# Patient Record
Sex: MALE | URBAN - METROPOLITAN AREA
[De-identification: names, ages, dates, MRNs, and addresses within clinical notes are randomized per-mention and may not be internally consistent; named-entity substitution may affect disease eponyms.]

---

## 2022-07-05 ENCOUNTER — LAB REQUISITION (OUTPATIENT)
Dept: LAB | Facility: HOSPITAL | Age: 72
End: 2022-07-05
Payer: MEDICARE

## 2022-07-05 DIAGNOSIS — L72.8 OTHER FOLLICULAR CYSTS OF THE SKIN AND SUBCUTANEOUS TISSUE: ICD-10-CM

## 2022-07-05 PROCEDURE — 87070 CULTURE OTHR SPECIMN AEROBIC: CPT | Performed by: PEDIATRICS

## 2022-07-08 LAB — BACTERIA SPEC CULT: NORMAL

## 2024-07-23 ENCOUNTER — ANESTHESIA EVENT (OUTPATIENT)
Dept: CARDIOLOGY | Facility: HOSPITAL | Age: 74
End: 2024-07-23
Payer: MEDICARE

## 2024-07-23 ENCOUNTER — ANESTHESIA (OUTPATIENT)
Dept: CARDIOLOGY | Facility: HOSPITAL | Age: 74
End: 2024-07-23
Payer: MEDICARE

## 2024-07-23 ENCOUNTER — HOSPITAL ENCOUNTER (OUTPATIENT)
Dept: CARDIOLOGY | Facility: HOSPITAL | Age: 74
Discharge: HOME | End: 2024-07-23
Attending: INTERNAL MEDICINE
Payer: MEDICARE

## 2024-07-23 VITALS
BODY MASS INDEX: 32.21 KG/M2 | SYSTOLIC BLOOD PRESSURE: 106 MMHG | HEART RATE: 59 BPM | OXYGEN SATURATION: 97 % | HEIGHT: 67 IN | WEIGHT: 205.25 LBS | RESPIRATION RATE: 18 BRPM | DIASTOLIC BLOOD PRESSURE: 63 MMHG | TEMPERATURE: 97 F

## 2024-07-23 DIAGNOSIS — I48.91 A-FIB: ICD-10-CM

## 2024-07-23 DIAGNOSIS — I48.91 ATRIAL FIBRILLATION: Primary | ICD-10-CM

## 2024-07-23 DIAGNOSIS — I48.91 ATRIAL FIBRILLATION: ICD-10-CM

## 2024-07-23 LAB
OHS QRS DURATION: 102 MS
OHS QRS DURATION: 102 MS
OHS QTC CALCULATION: 453 MS
OHS QTC CALCULATION: 455 MS
POCT GLUCOSE: 95 MG/DL (ref 70–110)

## 2024-07-23 PROCEDURE — 37000008 HC ANESTHESIA 1ST 15 MINUTES

## 2024-07-23 PROCEDURE — 37000009 HC ANESTHESIA EA ADD 15 MINS

## 2024-07-23 PROCEDURE — 63600175 PHARM REV CODE 636 W HCPCS: Performed by: NURSE ANESTHETIST, CERTIFIED REGISTERED

## 2024-07-23 PROCEDURE — 93005 ELECTROCARDIOGRAM TRACING: CPT

## 2024-07-23 PROCEDURE — 93010 ELECTROCARDIOGRAM REPORT: CPT | Mod: ,,, | Performed by: INTERNAL MEDICINE

## 2024-07-23 PROCEDURE — 25000003 PHARM REV CODE 250: Performed by: NURSE ANESTHETIST, CERTIFIED REGISTERED

## 2024-07-23 PROCEDURE — 92960 CARDIOVERSION ELECTRIC EXT: CPT

## 2024-07-23 RX ORDER — FLECAINIDE ACETATE 100 MG/1
50 TABLET ORAL 2 TIMES DAILY
COMMUNITY
Start: 2024-07-03

## 2024-07-23 RX ORDER — DULAGLUTIDE 0.75 MG/.5ML
0.75 INJECTION, SOLUTION SUBCUTANEOUS
COMMUNITY
Start: 2024-02-29

## 2024-07-23 RX ORDER — LEVOTHYROXINE, LIOTHYRONINE 57; 13.5 UG/1; UG/1
90 TABLET ORAL
COMMUNITY
Start: 2024-02-29

## 2024-07-23 RX ORDER — METOPROLOL SUCCINATE 50 MG/1
50 TABLET, EXTENDED RELEASE ORAL 2 TIMES DAILY
COMMUNITY

## 2024-07-23 RX ORDER — PHENYLEPHRINE HYDROCHLORIDE 10 MG/ML
INJECTION INTRAVENOUS
Status: DISCONTINUED | OUTPATIENT
Start: 2024-07-23 | End: 2024-07-23

## 2024-07-23 RX ORDER — OMEPRAZOLE 20 MG/1
20 CAPSULE, DELAYED RELEASE ORAL DAILY
COMMUNITY

## 2024-07-23 RX ORDER — APIXABAN 5 MG/1
1 TABLET, FILM COATED ORAL 2 TIMES DAILY
COMMUNITY
Start: 2024-02-29

## 2024-07-23 RX ORDER — TAMSULOSIN HYDROCHLORIDE 0.4 MG/1
1 CAPSULE ORAL DAILY
COMMUNITY

## 2024-07-23 RX ORDER — PROPOFOL 10 MG/ML
VIAL (ML) INTRAVENOUS
Status: DISCONTINUED | OUTPATIENT
Start: 2024-07-23 | End: 2024-07-23

## 2024-07-23 RX ORDER — HYDROCHLOROTHIAZIDE 25 MG/1
1 TABLET ORAL DAILY
COMMUNITY
Start: 2024-02-29

## 2024-07-23 RX ORDER — DULOXETIN HYDROCHLORIDE 30 MG/1
1 CAPSULE, DELAYED RELEASE ORAL DAILY
COMMUNITY

## 2024-07-23 RX ORDER — CETIRIZINE HYDROCHLORIDE 10 MG/1
1 TABLET, CHEWABLE ORAL EVERY MORNING
COMMUNITY

## 2024-07-23 RX ADMIN — SODIUM CHLORIDE, SODIUM GLUCONATE, SODIUM ACETATE, POTASSIUM CHLORIDE AND MAGNESIUM CHLORIDE: 526; 502; 368; 37; 30 INJECTION, SOLUTION INTRAVENOUS at 07:07

## 2024-07-23 RX ADMIN — PHENYLEPHRINE HYDROCHLORIDE 50 MCG: 10 INJECTION INTRAVENOUS at 07:07

## 2024-07-23 RX ADMIN — PROPOFOL 80 MG: 10 INJECTION, EMULSION INTRAVENOUS at 07:07

## 2024-07-23 NOTE — TRANSFER OF CARE
"Anesthesia Transfer of Care Note    Patient: Hilario Esteves    Procedure(s) Performed: * No procedures listed *    Patient location: PACU    Anesthesia Type: general    Transport from OR: Transported from OR on room air with adequate spontaneous ventilation    Post pain: adequate analgesia    Post assessment: no apparent anesthetic complications    Post vital signs: stable    Level of consciousness: sedated and responds to stimulation    Nausea/Vomiting: no nausea/vomiting    Complications: none    Transfer of care protocol was followed      Last vitals: Visit Vitals  /77   Pulse 81   Temp 36.3 °C (97.4 °F) (Oral)   Ht 5' 7" (1.702 m)   Wt 93.1 kg (205 lb 4 oz)   SpO2 97%   BMI 32.15 kg/m²     "

## 2024-07-23 NOTE — DISCHARGE SUMMARY
[facility] - Cardiology Diagnostics  Discharge Note  Short Stay    Cardioversion      OUTCOME: Patient tolerated treatment/procedure well without complication and is now ready for discharge.    DISPOSITION: Home or Self Care    FINAL DIAGNOSIS:  A-fib    FOLLOWUP: In clinic    DISCHARGE INSTRUCTIONS:  No discharge procedures on file.      Clinical Reference Documents Added to Patient Instructions         Document    CARDIOVERSION DISCHARGE INSTRUCTIONS (ENGLISH)    GENERAL ANESTHESIA DISCHARGE INSTRUCTIONS (ENGLISH)            TIME SPENT ON DISCHARGE: 15 minutes

## 2024-07-23 NOTE — ANESTHESIA PREPROCEDURE EVALUATION
07/23/2024  Hilario Esteves is a 74 y.o., male.  History of hyperlipidemia prostate cancer and atrial fibrillation, new onset.  Presenting for DC cardioversion.    On echo EF 50-55% and left heart catheterization shows widely patent coronaries    Pre-op Assessment    I have reviewed the Patient Summary Reports.     I have reviewed the Nursing Notes. I have reviewed the NPO Status.   I have reviewed the Medications.     Review of Systems  Anesthesia Hx:  No problems with previous Anesthesia                    Physical Exam  General: Well nourished, Cooperative, Alert and Oriented    Airway:  Mallampati: II   Mouth Opening: Normal  TM Distance: Normal  Tongue: Normal  Neck ROM: Normal ROM    Dental:  Intact        Anesthesia Plan  Type of Anesthesia, risks & benefits discussed:    Anesthesia Type: Gen Natural Airway  Intra-op Monitoring Plan: Standard ASA Monitors  Post Op Pain Control Plan: multimodal analgesia  Induction:  IV  Informed Consent: Informed consent signed with the Patient and all parties understand the risks and agree with anesthesia plan.  All questions answered. Patient consented to blood products? Yes  ASA Score: 3  Day of Surgery Review of History & Physical: H&P Update referred to the surgeon/provider.    Ready For Surgery From Anesthesia Perspective.     .

## 2024-07-26 NOTE — ANESTHESIA POSTPROCEDURE EVALUATION
Anesthesia Post Evaluation    Patient: Hilario Esteves    Procedure(s) Performed: * No procedures listed *    Final Anesthesia Type: general      Patient location during evaluation: PACU  Patient participation: Yes- Able to Participate  Level of consciousness: awake and alert  Post-procedure vital signs: reviewed and stable  Pain management: adequate  Airway patency: patent      Anesthetic complications: no      Cardiovascular status: hemodynamically stable  Respiratory status: unassisted  Hydration status: euvolemic  Follow-up not needed.              Vitals Value Taken Time   /63 07/23/24 0916   Temp n 07/26/24 1044   Pulse 62 07/23/24 0919   Resp 15 07/23/24 0918   SpO2 92 % 07/23/24 0919   Vitals shown include unfiled device data.      No case tracking events are documented in the log.      Pain/Rogers Score: No data recorded

## 2024-08-12 ENCOUNTER — ANESTHESIA EVENT (OUTPATIENT)
Dept: CARDIOLOGY | Facility: HOSPITAL | Age: 74
End: 2024-08-12
Payer: MEDICARE

## 2024-08-13 RX ORDER — TRIAMCINOLONE ACETONIDE 55 UG/1
2 SPRAY, METERED NASAL DAILY PRN
COMMUNITY

## 2024-08-13 RX ORDER — ICOSAPENT ETHYL 1 G/1
1 CAPSULE ORAL NIGHTLY
COMMUNITY

## 2024-08-13 RX ORDER — CHLORPHENIRAMINE MALEATE 4 MG
1 TABLET ORAL EVERY 6 HOURS PRN
COMMUNITY

## 2024-08-13 RX ORDER — AMOXICILLIN 500 MG
1 CAPSULE ORAL DAILY
COMMUNITY
End: 2024-08-13 | Stop reason: CLARIF

## 2024-08-19 ENCOUNTER — HOSPITAL ENCOUNTER (OUTPATIENT)
Facility: HOSPITAL | Age: 74
Discharge: HOME | End: 2024-08-19
Attending: INTERNAL MEDICINE | Admitting: INTERNAL MEDICINE
Payer: MEDICARE

## 2024-08-19 ENCOUNTER — HOSPITAL ENCOUNTER (OUTPATIENT)
Dept: CARDIOLOGY | Facility: HOSPITAL | Age: 74
Discharge: HOME | End: 2024-08-19
Attending: INTERNAL MEDICINE | Admitting: INTERNAL MEDICINE
Payer: MEDICARE

## 2024-08-19 ENCOUNTER — ANESTHESIA (OUTPATIENT)
Dept: CARDIOLOGY | Facility: HOSPITAL | Age: 74
End: 2024-08-19
Payer: MEDICARE

## 2024-08-19 VITALS
HEART RATE: 77 BPM | HEIGHT: 71 IN | DIASTOLIC BLOOD PRESSURE: 76 MMHG | TEMPERATURE: 98 F | OXYGEN SATURATION: 100 % | RESPIRATION RATE: 21 BRPM | SYSTOLIC BLOOD PRESSURE: 123 MMHG | BODY MASS INDEX: 29.75 KG/M2 | WEIGHT: 212.5 LBS

## 2024-08-19 DIAGNOSIS — I48.91 ATRIAL FIBRILLATION: ICD-10-CM

## 2024-08-19 DIAGNOSIS — I48.19 PERSISTENT ATRIAL FIBRILLATION: ICD-10-CM

## 2024-08-19 DIAGNOSIS — I48.92 ATRIAL FLUTTER: ICD-10-CM

## 2024-08-19 DIAGNOSIS — I48.19 PERSISTENT ATRIAL FIBRILLATION: Primary | ICD-10-CM

## 2024-08-19 LAB
BSA FOR ECHO PROCEDURE: 2.2 M2
OHS QRS DURATION: 94 MS
OHS QRS DURATION: 98 MS
OHS QTC CALCULATION: 433 MS
OHS QTC CALCULATION: 486 MS
POCT GLUCOSE: 95 MG/DL (ref 70–110)

## 2024-08-19 PROCEDURE — C1894 INTRO/SHEATH, NON-LASER: HCPCS | Performed by: INTERNAL MEDICINE

## 2024-08-19 PROCEDURE — 36620 INSERTION CATHETER ARTERY: CPT | Mod: 59,,, | Performed by: ANESTHESIOLOGY

## 2024-08-19 PROCEDURE — D9220A PRA ANESTHESIA: Mod: CRNA,,,

## 2024-08-19 PROCEDURE — 93656 COMPRE EP EVAL ABLTJ ATR FIB: CPT | Performed by: INTERNAL MEDICINE

## 2024-08-19 PROCEDURE — 37000009 HC ANESTHESIA EA ADD 15 MINS: Performed by: INTERNAL MEDICINE

## 2024-08-19 PROCEDURE — 93312 ECHO TRANSESOPHAGEAL: CPT

## 2024-08-19 PROCEDURE — 63600175 PHARM REV CODE 636 W HCPCS

## 2024-08-19 PROCEDURE — 76937 US GUIDE VASCULAR ACCESS: CPT | Mod: 26,,, | Performed by: ANESTHESIOLOGY

## 2024-08-19 PROCEDURE — 27201423 OPTIME MED/SURG SUP & DEVICES STERILE SUPPLY: Performed by: INTERNAL MEDICINE

## 2024-08-19 PROCEDURE — 85347 COAGULATION TIME ACTIVATED: CPT | Performed by: INTERNAL MEDICINE

## 2024-08-19 PROCEDURE — C1753 CATH, INTRAVAS ULTRASOUND: HCPCS | Performed by: INTERNAL MEDICINE

## 2024-08-19 PROCEDURE — 51702 INSERT TEMP BLADDER CATH: CPT

## 2024-08-19 PROCEDURE — C2630 CATH, EP, COOL-TIP: HCPCS | Performed by: INTERNAL MEDICINE

## 2024-08-19 PROCEDURE — 93325 DOPPLER ECHO COLOR FLOW MAPG: CPT

## 2024-08-19 PROCEDURE — 25000003 PHARM REV CODE 250

## 2024-08-19 PROCEDURE — C1730 CATH, EP, 19 OR FEW ELECT: HCPCS | Performed by: INTERNAL MEDICINE

## 2024-08-19 PROCEDURE — D9220A PRA ANESTHESIA: Mod: ANES,,, | Performed by: ANESTHESIOLOGY

## 2024-08-19 PROCEDURE — 37000008 HC ANESTHESIA 1ST 15 MINUTES: Performed by: INTERNAL MEDICINE

## 2024-08-19 PROCEDURE — 63600175 PHARM REV CODE 636 W HCPCS: Performed by: INTERNAL MEDICINE

## 2024-08-19 PROCEDURE — 25000003 PHARM REV CODE 250: Performed by: INTERNAL MEDICINE

## 2024-08-19 PROCEDURE — C1769 GUIDE WIRE: HCPCS | Performed by: INTERNAL MEDICINE

## 2024-08-19 PROCEDURE — 93005 ELECTROCARDIOGRAM TRACING: CPT | Mod: 59

## 2024-08-19 PROCEDURE — 93010 ELECTROCARDIOGRAM REPORT: CPT | Mod: ,,, | Performed by: STUDENT IN AN ORGANIZED HEALTH CARE EDUCATION/TRAINING PROGRAM

## 2024-08-19 PROCEDURE — C1760 CLOSURE DEV, VASC: HCPCS | Performed by: INTERNAL MEDICINE

## 2024-08-19 DEVICE — SYS CLSR VASCADE MVP ID6-12FR: Type: IMPLANTABLE DEVICE | Site: GROIN | Status: FUNCTIONAL

## 2024-08-19 RX ORDER — PHENYLEPHRINE HYDROCHLORIDE 10 MG/ML
INJECTION INTRAVENOUS
Status: DISCONTINUED | OUTPATIENT
Start: 2024-08-19 | End: 2024-08-19

## 2024-08-19 RX ORDER — ENOXAPARIN SODIUM 150 MG/ML
1 INJECTION SUBCUTANEOUS ONCE
Status: ON HOLD | COMMUNITY
End: 2024-08-19 | Stop reason: HOSPADM

## 2024-08-19 RX ORDER — LIDOCAINE HYDROCHLORIDE 20 MG/ML
INJECTION, SOLUTION EPIDURAL; INFILTRATION; INTRACAUDAL; PERINEURAL
Status: DISCONTINUED | OUTPATIENT
Start: 2024-08-19 | End: 2024-08-19

## 2024-08-19 RX ORDER — FENTANYL CITRATE 50 UG/ML
INJECTION, SOLUTION INTRAMUSCULAR; INTRAVENOUS
Status: DISCONTINUED | OUTPATIENT
Start: 2024-08-19 | End: 2024-08-19

## 2024-08-19 RX ORDER — SODIUM CITRATE AND CITRIC ACID MONOHYDRATE 334; 500 MG/5ML; MG/5ML
30 SOLUTION ORAL
Status: DISCONTINUED | OUTPATIENT
Start: 2024-08-19 | End: 2024-08-19 | Stop reason: HOSPADM

## 2024-08-19 RX ORDER — DEXAMETHASONE SODIUM PHOSPHATE 4 MG/ML
INJECTION, SOLUTION INTRA-ARTICULAR; INTRALESIONAL; INTRAMUSCULAR; INTRAVENOUS; SOFT TISSUE
Status: DISCONTINUED | OUTPATIENT
Start: 2024-08-19 | End: 2024-08-19

## 2024-08-19 RX ORDER — ROCURONIUM BROMIDE 10 MG/ML
INJECTION, SOLUTION INTRAVENOUS
Status: DISCONTINUED | OUTPATIENT
Start: 2024-08-19 | End: 2024-08-19

## 2024-08-19 RX ORDER — HEPARIN SODIUM 1000 [USP'U]/ML
INJECTION, SOLUTION INTRAVENOUS; SUBCUTANEOUS CONTINUOUS PRN
Status: DISCONTINUED | OUTPATIENT
Start: 2024-08-19 | End: 2024-08-19

## 2024-08-19 RX ORDER — PROPOFOL 10 MG/ML
VIAL (ML) INTRAVENOUS
Status: DISCONTINUED | OUTPATIENT
Start: 2024-08-19 | End: 2024-08-19

## 2024-08-19 RX ORDER — ACETAMINOPHEN 325 MG/1
650 TABLET ORAL EVERY 4 HOURS PRN
Status: DISCONTINUED | OUTPATIENT
Start: 2024-08-19 | End: 2024-08-19 | Stop reason: HOSPADM

## 2024-08-19 RX ORDER — LIDOCAINE HYDROCHLORIDE 10 MG/ML
1 INJECTION, SOLUTION EPIDURAL; INFILTRATION; INTRACAUDAL; PERINEURAL ONCE
Status: DISCONTINUED | OUTPATIENT
Start: 2024-08-19 | End: 2024-08-19 | Stop reason: HOSPADM

## 2024-08-19 RX ORDER — FUROSEMIDE 10 MG/ML
INJECTION INTRAMUSCULAR; INTRAVENOUS
Status: DISCONTINUED | OUTPATIENT
Start: 2024-08-19 | End: 2024-08-19

## 2024-08-19 RX ORDER — HEPARIN SODIUM 1000 [USP'U]/ML
INJECTION, SOLUTION INTRAVENOUS; SUBCUTANEOUS
Status: DISCONTINUED | OUTPATIENT
Start: 2024-08-19 | End: 2024-08-19

## 2024-08-19 RX ORDER — ONDANSETRON HYDROCHLORIDE 2 MG/ML
INJECTION, SOLUTION INTRAVENOUS
Status: DISCONTINUED | OUTPATIENT
Start: 2024-08-19 | End: 2024-08-19

## 2024-08-19 RX ORDER — ADENOSINE 3 MG/ML
INJECTION, SOLUTION INTRAVENOUS
Status: DISCONTINUED | OUTPATIENT
Start: 2024-08-19 | End: 2024-08-19 | Stop reason: HOSPADM

## 2024-08-19 RX ORDER — PROTAMINE SULFATE 10 MG/ML
INJECTION, SOLUTION INTRAVENOUS
Status: DISCONTINUED | OUTPATIENT
Start: 2024-08-19 | End: 2024-08-19

## 2024-08-19 RX ORDER — SODIUM CHLORIDE, SODIUM GLUCONATE, SODIUM ACETATE, POTASSIUM CHLORIDE AND MAGNESIUM CHLORIDE 30; 37; 368; 526; 502 MG/100ML; MG/100ML; MG/100ML; MG/100ML; MG/100ML
INJECTION, SOLUTION INTRAVENOUS CONTINUOUS
Status: DISCONTINUED | OUTPATIENT
Start: 2024-08-19 | End: 2024-08-19 | Stop reason: HOSPADM

## 2024-08-19 RX ORDER — HYDROCODONE BITARTRATE AND ACETAMINOPHEN 5; 325 MG/1; MG/1
1 TABLET ORAL EVERY 4 HOURS PRN
Status: DISCONTINUED | OUTPATIENT
Start: 2024-08-19 | End: 2024-08-19 | Stop reason: HOSPADM

## 2024-08-19 RX ORDER — LIDOCAINE HYDROCHLORIDE 10 MG/ML
INJECTION, SOLUTION INFILTRATION; PERINEURAL
Status: DISCONTINUED | OUTPATIENT
Start: 2024-08-19 | End: 2024-08-19 | Stop reason: HOSPADM

## 2024-08-19 RX ADMIN — FENTANYL CITRATE 25 MCG: 50 INJECTION, SOLUTION INTRAMUSCULAR; INTRAVENOUS at 11:08

## 2024-08-19 RX ADMIN — DEXAMETHASONE SODIUM PHOSPHATE 4 MG: 4 INJECTION, SOLUTION INTRA-ARTICULAR; INTRALESIONAL; INTRAMUSCULAR; INTRAVENOUS; SOFT TISSUE at 10:08

## 2024-08-19 RX ADMIN — LIDOCAINE HYDROCHLORIDE 80 MG: 20 INJECTION, SOLUTION EPIDURAL; INFILTRATION; INTRACAUDAL; PERINEURAL at 09:08

## 2024-08-19 RX ADMIN — SODIUM CHLORIDE, SODIUM GLUCONATE, SODIUM ACETATE, POTASSIUM CHLORIDE AND MAGNESIUM CHLORIDE: 526; 502; 368; 37; 30 INJECTION, SOLUTION INTRAVENOUS at 11:08

## 2024-08-19 RX ADMIN — PHENYLEPHRINE HYDROCHLORIDE 200 MCG: 10 INJECTION INTRAVENOUS at 11:08

## 2024-08-19 RX ADMIN — HEPARIN SODIUM 5000 UNITS: 1000 INJECTION, SOLUTION INTRAVENOUS; SUBCUTANEOUS at 10:08

## 2024-08-19 RX ADMIN — PROPOFOL 25 MG: 10 INJECTION, EMULSION INTRAVENOUS at 10:08

## 2024-08-19 RX ADMIN — FENTANYL CITRATE 50 MCG: 50 INJECTION, SOLUTION INTRAMUSCULAR; INTRAVENOUS at 10:08

## 2024-08-19 RX ADMIN — PROTAMINE SULFATE 40 MG: 10 INJECTION, SOLUTION INTRAVENOUS at 11:08

## 2024-08-19 RX ADMIN — PHENYLEPHRINE HYDROCHLORIDE 100 MCG: 10 INJECTION INTRAVENOUS at 11:08

## 2024-08-19 RX ADMIN — ROCURONIUM BROMIDE 20 MG: 10 SOLUTION INTRAVENOUS at 11:08

## 2024-08-19 RX ADMIN — HEPARIN SODIUM 1000 UNITS/HR: 1000 INJECTION, SOLUTION INTRAVENOUS; SUBCUTANEOUS at 10:08

## 2024-08-19 RX ADMIN — ROCURONIUM BROMIDE 10 MG: 10 SOLUTION INTRAVENOUS at 10:08

## 2024-08-19 RX ADMIN — HEPARIN SODIUM 3000 UNITS: 1000 INJECTION, SOLUTION INTRAVENOUS; SUBCUTANEOUS at 10:08

## 2024-08-19 RX ADMIN — ROCURONIUM BROMIDE 10 MG: 10 SOLUTION INTRAVENOUS at 11:08

## 2024-08-19 RX ADMIN — PROPOFOL 150 MG: 10 INJECTION, EMULSION INTRAVENOUS at 10:08

## 2024-08-19 RX ADMIN — ONDANSETRON 4 MG: 2 INJECTION INTRAMUSCULAR; INTRAVENOUS at 11:08

## 2024-08-19 RX ADMIN — SODIUM CHLORIDE, SODIUM GLUCONATE, SODIUM ACETATE, POTASSIUM CHLORIDE AND MAGNESIUM CHLORIDE: 526; 502; 368; 37; 30 INJECTION, SOLUTION INTRAVENOUS at 09:08

## 2024-08-19 RX ADMIN — PHENYLEPHRINE HYDROCHLORIDE 100 MCG: 10 INJECTION INTRAVENOUS at 10:08

## 2024-08-19 RX ADMIN — PHENYLEPHRINE HYDROCHLORIDE 50 MCG: 10 INJECTION INTRAVENOUS at 11:08

## 2024-08-19 RX ADMIN — FUROSEMIDE 20 MG: 10 INJECTION, SOLUTION INTRAMUSCULAR; INTRAVENOUS at 11:08

## 2024-08-19 RX ADMIN — SUGAMMADEX 200 MG: 100 INJECTION, SOLUTION INTRAVENOUS at 11:08

## 2024-08-19 RX ADMIN — ROCURONIUM BROMIDE 50 MG: 10 SOLUTION INTRAVENOUS at 09:08

## 2024-08-19 NOTE — ANESTHESIA PREPROCEDURE EVALUATION
08/19/2024  Hilario Esteves is a 74 y.o., male with obesity, pre-dm, sleep apnea, uses cpap, afib, on blood thinners, s/p acdf and other medical problems noted in the EMR      Pre-op Assessment    I have reviewed the Patient Summary Reports.     I have reviewed the Nursing Notes. I have reviewed the NPO Status.   I have reviewed the Medications.     Review of Systems  Anesthesia Hx:  No problems with previous Anesthesia                Cardiovascular:  Exercise tolerance: good                                               Physical Exam  General: Well nourished and Cooperative    Airway:  Mallampati: II   Mouth Opening: Normal  TM Distance: Normal  Tongue: Normal  Neck ROM: Normal ROM  Neck: Girth Increased    Dental:  Periodontal disease    Chest/Lungs:  Clear to auscultation    Heart:  Rhythm: Irregularly Irregular        Anesthesia Plan  Type of Anesthesia, risks & benefits discussed:    Anesthesia Type: Gen ETT  Intra-op Monitoring Plan: Standard ASA Monitors and Art Line  Post Op Pain Control Plan: multimodal analgesia  Induction:  IV  Informed Consent: Informed consent signed with the Patient and all parties understand the risks and agree with anesthesia plan.  All questions answered.   ASA Score: 3  Day of Surgery Review of History & Physical: H&P Update referred to the surgeon/provider.    Ready For Surgery From Anesthesia Perspective.     .  I explained anesthesia plan to patient/responsbile party if available.  Anesthesia consent done going over the material facts, risks, complications & alternatives, obtained which includes the possibility of altering the anesthesia plan.  I reviewed problem list, prior to admission medication list, appropriate labs, any workup, Xray, EKG etc noted below.  Patients condition is satisfactory to proceed with anesthesia plan unless otherwise noted (see anesthesia  "chart for details of the anesthesia plan carried out).      Pre-operative evaluation for Procedure(s) (LRB):  Ablation (N/A)  Transesophageal echo (TEDDY) intra-procedure log documentation (N/A)    /84   Pulse 92   Temp 36.3 °C (97.4 °F) (Oral)   Ht 5' 7" (1.702 m)   Wt 93 kg (205 lb)   SpO2 98%   BMI 32.11 kg/m²     Patient Active Problem List   Diagnosis    A-fib       Review of patient's allergies indicates:  No Known Allergies    Current Outpatient Medications   Medication Instructions    cetirizine (ZYRTEC) 10 MG tablet 1 tablet, Oral, Nightly    chlorpheniramine (CHLOR-TRIMETON) 4 mg tablet 1 tablet, Oral, Every 6 hours PRN    DULoxetine (CYMBALTA) 30 MG capsule 1 capsule, Oral, Nightly    ELIQUIS 5 mg Tab 1 tablet, Oral, 2 times daily    flecainide (TAMBOCOR) 50 mg, Oral, 2 times daily    hydroCHLOROthiazide (HYDRODIURIL) 25 MG tablet 1 tablet, Oral, Daily    icosapent ethyL (VASCEPA) 1 gram Cap 1 capsule, Oral, Nightly    metoprolol succinate (TOPROL-XL) 25-50 mg, Oral, 2 times daily, Take 50 mg in Am and 25 mg in evening.    NP THYROID 90 mg, Oral, Before breakfast    omeprazole (PRILOSEC) 20 mg, Oral, Daily    tamsulosin (FLOMAX) 0.4 mg Cap 1 capsule, Oral, Daily    triamcinolone (NASACORT) 55 mcg nasal inhaler 2 sprays, Nasal, Daily PRN    TRULICITY 0.75 mg, Subcutaneous, Every 7 days       Past Surgical History:   Procedure Laterality Date    CARDIOVERSION      x2    CATARACT EXTRACTION Bilateral     COLONOSCOPY      ELBOW SURGERY Left     multiple    ESOPHAGOGASTRODUODENOSCOPY      NECK SURGERY      ORCHIECTOMY      TONSILLECTOMY      VEIN SURGERY         Social History     Socioeconomic History    Marital status:    Tobacco Use    Smoking status: Never    Smokeless tobacco: Never   Substance and Sexual Activity    Alcohol use: Not Currently     Alcohol/week: 2.0 standard drinks of alcohol     Types: 2 Cans of beer per week    Drug use: Never             Diagnostic " Studies:    .1/30/24...Conclusions:   Widely patent coronary arteries.   Preserved left ventricular systolic function.   No patient was in A-fib with RVR throughout the course of this procedure       EKG:  Results for orders placed or performed during the hospital encounter of 07/23/24   EKG 12-lead    Collection Time: 07/23/24  8:33 AM   Result Value Ref Range    QRS Duration 102 ms    [facility] QTC Calculation 455 ms    Narrative    Test Reason : I48.91,    Vent. Rate : 065 BPM     Atrial Rate : 065 BPM     P-R Int : 204 ms          QRS Dur : 102 ms      QT Int : 438 ms       P-R-T Axes : 070 036 174 degrees     QTc Int : 455 ms    Sinus rhythm with Premature atrial complexes  ST and T wave abnormality, consider anterolateral ischemia  Abnormal ECG  When compared with ECG of 23-JUL-2024 06:28,  Sinus rhythm has replaced Atrial fibrillation  Confirmed by Dillon Enriquez MD ([id number]) on 7/23/2024 8:12:59 PM    Referred By: SANGEETA MCMAHON           Confirmed By:Dillon Almazan MD

## 2024-08-19 NOTE — ANESTHESIA PROCEDURE NOTES
Arterial    Diagnosis: Invasive monitoring    Patient location during procedure: done in OR  Timeout: 8/19/2024 9:51 AM  Procedure end time: 8/19/2024 9:58 AM    Staffing  Authorizing Provider: Jerardo Almazan MD  Performing Provider: Jerardo Almazan MD    Staffing  Performed by: Jerardo Almazan MD  Authorized by: Jerardo Almazan MD    Anesthesiologist was present at the time of the procedure.    Preanesthetic Checklist  Completed: patient identified, IV checked, site marked, risks and benefits discussed, surgical consent, monitors and equipment checked, pre-op evaluation, timeout performed and anesthesia consent givenArterial  Skin Prep: chlorhexidine gluconate and isopropyl alcohol  Local Infiltration: lidocaine (5 cc 1% Lidocaine for skin wheal)  Orientation: left  Location: radial    Catheter size: see details below.  Catheter placement by Ultrasound guidance (US was used to visualize needle entry into the vessel.). Heme positive aspiration all ports.   Vessel Caliber: medium, patent, compressibility normal  Needle advanced into vessel with real time Ultrasound guidance.  Sterile sheath used.  Image recorded and saved.Insertion Attempts: 1  Assessment  Dressing: secured with tape and tegaderm  Patient: Tolerated well  Additional Notes  Arrow radial artery cath set used...20GaX1&3/4inch (4.45cm) radio-opaque polyurethane over 22Ga TW introducer needle with integral 0.018inch (0.46mm) hellen spring-wire guide used for arterial access.  Location confirmed with flashback, catheter advanced into vessel, intro needle/wire removed and luer lock connector attached to catheter.  Catheter flushed with saline, armboard secured.

## 2024-08-19 NOTE — Clinical Note
A dressing was applied to the right femoral vein puncture site. All four femoral venous access sites covered with sterile gauze and tegaderm.

## 2024-08-19 NOTE — TRANSFER OF CARE
"Anesthesia Transfer of Care Note    Patient: Hilario Esteves    Procedure(s) Performed: Procedure(s) (LRB):  Ablation (N/A)  Transesophageal echo (TEDDY) intra-procedure log documentation (N/A)    Patient location: PACU    Anesthesia Type: general    Transport from OR: Transported from OR on 2-3 L/min O2 by NC with adequate spontaneous ventilation. Continuous SpO2 monitoring in transport    Post pain: adequate analgesia    Post assessment: no apparent anesthetic complications    Post vital signs: stable    Level of consciousness: awake, alert and oriented    Nausea/Vomiting: no nausea/vomiting    Complications: none    Transfer of care protocol was followed      Last vitals: Visit Vitals  /65 (BP Location: Left arm, Patient Position: Lying)   Pulse 68   Temp 36.5 °C (97.7 °F) (Skin)   Resp 16   Ht 5' 11" (1.803 m)   Wt 96.4 kg (212 lb 8.4 oz)   SpO2 97%   BMI 29.64 kg/m²     "

## 2024-08-19 NOTE — DISCHARGE SUMMARY
[facility] - Cath Lab Services  Discharge Note  Short Stay    Procedure(s) (LRB):  Ablation (N/A)  Transesophageal echo (TEDDY) intra-procedure log documentation (N/A)      OUTCOME: Patient tolerated treatment/procedure well without complication and is now ready for discharge.    DISPOSITION: Home or Self Care    FINAL DIAGNOSIS:  Persistent atrial fibrillation    FOLLOWUP: In clinic    DISCHARGE INSTRUCTIONS:  No discharge procedures on file.     TIME SPENT ON DISCHARGE: 15 minutes

## 2024-08-19 NOTE — Clinical Note
The procedural consent was signed. A history and physical note was completed in the chart. Chonodrocutaneous Helical Advancement Flap Text: The defect edges were debeveled with a #15 scalpel blade.  Given the location of the defect and the proximity to free margins a chondrocutaneous helical advancement flap was deemed most appropriate.  Using a sterile surgical marker, the appropriate advancement flap was drawn incorporating the defect and placing the expected incisions within the relaxed skin tension lines where possible.    The area thus outlined was incised deep to adipose tissue with a #15 scalpel blade.  The skin margins were undermined to an appropriate distance in all directions utilizing iris scissors.

## 2024-08-19 NOTE — ANESTHESIA POSTPROCEDURE EVALUATION
Anesthesia Post Evaluation    Patient: Hilario Esteves    Procedure(s) Performed: Procedure(s) (LRB):  Ablation (N/A)  Transesophageal echo (TEDDY) intra-procedure log documentation (N/A)    Final Anesthesia Type: general (/Regional//MAC)      Patient location during evaluation: PACU  Post-procedure mental status: @ basline.  Pain management: adequate    PONV status: See postop meds for drugs used to control n/v if any.  Anesthetic complications: no      Cardiovascular status: blood pressure returned to baseline  Respiratory status: @ baseline.  Hydration status: euvolemic                Vitals Value Taken Time   /73 08/19/24 1312   Temp 36.7 °C (98.1 °F) 08/19/24 1310   Pulse 72 08/19/24 1320   Resp 19 08/19/24 1318   SpO2 96 % 08/19/24 1320   Vitals shown include unfiled device data.      No case tracking events are documented in the log.      Pain/Rogers Score: Rogers Score: 10 (8/19/2024 12:55 PM)

## 2024-08-19 NOTE — ANESTHESIA PROCEDURE NOTES
Intubation    Date/Time: 8/19/2024 9:45 AM    Performed by: Shi Diez CRNA  Authorized by: Jerardo Almazan MD    Intubation:     Induction:  Intravenous    Intubated:  Postinduction    Mask Ventilation:  Easy mask    Attempts:  1    Attempted By:  CRNA    Method of Intubation:  Direct    Blade:  Pickard 2    Laryngeal View Grade: Grade I - full view of cords      Difficult Airway Encountered?: No      Complications:  None    Airway Device:  Oral endotracheal tube    Airway Device Size:  7.5    Style/Cuff Inflation:  Cuffed (inflated to minimal occlusive pressure)    Inflation Amount (mL):  6    Tube secured:  23    Secured at:  The lips    Placement Verified By:  Capnometry    Complicating Factors:  None    Findings Post-Intubation:  BS equal bilateral and atraumatic/condition of teeth unchanged

## 2024-11-18 PROBLEM — R59.0 ANTERIOR MEDIASTINAL LYMPHADENOPATHY: Status: ACTIVE | Noted: 2024-11-18

## 2024-11-22 ENCOUNTER — CLINICAL SUPPORT (OUTPATIENT)
Dept: REHABILITATION | Facility: HOSPITAL | Age: 74
End: 2024-11-22
Attending: NURSE PRACTITIONER
Payer: MEDICARE

## 2024-11-22 ENCOUNTER — HOSPITAL ENCOUNTER (OUTPATIENT)
Dept: RADIOLOGY | Facility: HOSPITAL | Age: 74
Discharge: HOME | End: 2024-11-22
Attending: NURSE PRACTITIONER
Payer: MEDICARE

## 2024-11-22 DIAGNOSIS — R13.10 DYSPHAGIA, UNSPECIFIED TYPE: ICD-10-CM

## 2024-11-22 PROCEDURE — 92611 MOTION FLUOROSCOPY/SWALLOW: CPT

## 2024-11-22 PROCEDURE — 74230 X-RAY XM SWLNG FUNCJ C+: CPT | Mod: TC

## 2024-11-22 NOTE — PROGRESS NOTES
[facility]  Speech Language Pathology Department  Outpatient Modified Barium Swallow Study    Patient Name:  Hilario Esteves   MRN:  [id number]    Recommendations     General recommendations:  no SLP intervention indicated  Repeat MBS study: not warranted  Diet texture/consistency recommendations: Regular solids (IDDSI 7) and thin liquids (IDDSI 0)  Medications: per patient preference  Swallow strategies/precautions: small bites/sips, slow rate, alternate solids/liquids, and upright for PO intake    History/Reason for Referral     Hilario Esteves is a/n 74 y.o. male referred by FNP for a Modified Barium Swallow Study due to patient's c/o  globus sensation in upper chest with PO intake.    Past medical history includes spinal surgery with cervical hardware observed on imaging.    Home diet texture/consistency: Regular and thin liquids  Current Method of Nutrition: PO diet (same as above)    Subjective     Patient awake, alert, and cooperative.  Spiritual/Cultural/Buddhism Beliefs/Practices that affect care: no    Pain/Comfort: 0/10    Respiratory Status:  room air    Restraints/positioning devices: none    Radiologist: Lindsey Williamson MD    Fluoroscopic Findings     Oral Musculature  Dentition: own teeth  Secretion Management: adequate  Mucosal Quality: good  Facial Movement: WFL  Buccal Strength & Mobility: WFL  Mandibular Strength & Mobility: WFL  Oral Labial Strength & Mobility: WFL  Lingual Strength & Mobility: WFL  Velar Elevation: WFL  Vocal Quality: adequate    Setup  Seated in straight chair  Able to self feed  Adequate head control    Visualization  Lateral view  Cervical hardware noted    Oral Phase:   Adequate lip closure  Adequate bolus formation  Adequate mastication  Adequate bolus cohesion  Adequate anterior-posterior transport  Loss of bolus control with liquids    Pharyngeal Phase:   Timely swallow reflex  Adequate base of tongue retraction  Reduced epiglottic  deflection  Reduced hyolaryngeal excursion  Adequate airway protection    Consistency Laryngeal Penetration Aspiration Residue   Thin liquid by cup During the swallow  Cleared spontaneously None Trace   Thin liquid by straw During the swallow  Cleared spontaneously None Trace   Puree None None None   Chewable solid None None Trace     Cervical Esophageal Phase:   UES appeared to accommodate all bolus types without stasis or retrograde movement visualized    Assessment     Oropharyngeal swallow WFL with no aspiration visualized during this study     Patient appears to be at low risk for aspiration related pneumonia.     Patient Education     Patient provided with verbal education regarding ST POC.  Understanding was verbalized.    Time Tracking     SLP Treatment Date: 11/22/24  Speech Start Time:  0815  Speech Stop Time:  0830     Speech Total Time (min):  15    Billable minutes:   Motion Fluoroscopic Evaluation, Video Recording, 15 minutes     11/22/2024

## 2024-12-18 ENCOUNTER — HOSPITAL ENCOUNTER (OUTPATIENT)
Dept: RADIOLOGY | Facility: HOSPITAL | Age: 74
Discharge: HOME | End: 2024-12-18
Attending: INTERNAL MEDICINE
Payer: MEDICARE

## 2024-12-18 DIAGNOSIS — R59.0 LOCALIZED ENLARGED LYMPH NODES: ICD-10-CM

## 2024-12-18 DIAGNOSIS — R59.0 MEDIASTINAL ADENOPATHY: ICD-10-CM

## 2024-12-18 PROCEDURE — 25500020 PHARM REV CODE 255: Performed by: INTERNAL MEDICINE

## 2024-12-18 PROCEDURE — 71260 CT THORAX DX C+: CPT | Mod: TC

## 2024-12-18 RX ADMIN — IOHEXOL 100 ML: 350 INJECTION, SOLUTION INTRAVENOUS at 07:12

## 2025-01-06 ENCOUNTER — PROCEDURE VISIT (OUTPATIENT)
Dept: RESPIRATORY THERAPY | Facility: HOSPITAL | Age: 75
End: 2025-01-06
Attending: INTERNAL MEDICINE
Payer: MEDICARE

## 2025-01-06 VITALS — OXYGEN SATURATION: 98 % | HEART RATE: 60 BPM | RESPIRATION RATE: 18 BRPM

## 2025-01-06 DIAGNOSIS — J98.59 MEDIASTINAL MASS: ICD-10-CM

## 2025-01-06 LAB
DLCO SINGLE BREATH LLN: 16.74
DLCO SINGLE BREATH PRE REF: 76.1 %
DLCO SINGLE BREATH REF: 23.67
DLCOC SBVA LLN: 2.39
DLCOC SBVA REF: 3.63
DLCOC SINGLE BREATH LLN: 16.74
DLCOC SINGLE BREATH REF: 23.67
DLCOCSBVAULN: 4.88
DLCOCSINGLEBREATHULN: 30.6
DLCOSINGLEBREATHULN: 30.6
DLCOVA LLN: 2.39
DLCOVA PRE REF: 95 %
DLCOVA PRE: 3.45 ML/(MIN*MMHG*L) (ref 2.39–4.88)
DLCOVA REF: 3.63
DLCOVAULN: 4.88
ERV LLN: -16449.08
ERV PRE REF: 82.1 %
ERV REF: 0.92
ERVULN: ABNORMAL
FEF 25 75 LLN: 1.07
FEF 25 75 PRE REF: 67.3 %
FEF 25 75 REF: 2.78
FEF2575CHANGE: 12.4 %
FET100CHANGE: 14.4 %
FEV1 FVC LLN: 61
FEV1 FVC PRE REF: 99.3 %
FEV1 FVC REF: 76
FEV1 LLN: 1.93
FEV1 PRE REF: 90.9 %
FEV1 REF: 2.74
FEV1CHANGE: 1 %
FEV1FVCCHANGE: 2.1 %
FRCPLETH LLN: 2.58
FRCPLETH PREREF: 90.2 %
FRCPLETH REF: 3.57
FRCPLETHULN: 4.55
FVC LLN: 2.66
FVC PRE REF: 91.1 %
FVC REF: 3.64
FVCCHANGE: -1.1 %
IVC PRE: 3.27 L (ref 2.66–4.63)
IVC SINGLE BREATH LLN: 2.66
IVC SINGLE BREATH PRE REF: 90 %
IVC SINGLE BREATH REF: 3.64
IVCSINGLEBREATHULN: 4.63
LLN IC: -9999997.29
MVV LLN: 90
MVV PRE REF: 94.5 %
MVV REF: 106
PEF LLN: 5.05
PEF PRE REF: 73.5 %
PEF REF: 7.17
PEFCHANGE: 24.8 %
POST FEF 25 75: 2.1 L/S (ref 1.07–4.49)
POST FET 100: 7.6 SEC
POST FEV1 FVC: 76.76 % (ref 61.47–88.51)
POST FEV1: 2.52 L (ref 1.93–3.49)
POST FVC: 3.28 L (ref 2.66–4.63)
POST PEF: 6.57 L/S (ref 5.05–9.29)
PRE DLCO: 18 ML/(MIN*MMHG) (ref 16.74–30.6)
PRE ERV: 0.75 L (ref -16449.08–16450.92)
PRE FEF 25 75: 1.87 L/S (ref 1.07–4.49)
PRE FET 100: 6.64 SEC
PRE FEV1 FVC: 75.17 % (ref 61.47–88.51)
PRE FEV1: 2.49 L (ref 1.93–3.49)
PRE FRC PL: 3.22 L (ref 2.58–4.55)
PRE FVC: 3.31 L (ref 2.66–4.63)
PRE IC: 2.56 L (ref -9999997.29–#######.####)
PRE MVV: 100.3 L/MIN (ref 90.19–122.03)
PRE PEF: 5.27 L/S (ref 5.05–9.29)
PRE REF IC: 94.4 %
PRE RV: 2.46 L (ref 1.97–3.32)
PRE TLC: 5.78 L (ref 5.37–7.67)
RAW PRE REF: 91.4 %
RAW PRE: 2.8 CMH2O*S/L (ref 3.06–3.06)
RAW REF: 3.06
REF IC: 2.71
RV LLN: 1.97
RV PRE REF: 93 %
RV REF: 2.65
RVTLC LLN: 34
RVTLC PRE REF: 98.7 %
RVTLC PRE: 42.64 % (ref 34.23–52.19)
RVTLC REF: 43
RVTLCULN: 52
RVULN: 3.32
SGAW PRE REF: 120.9 %
SGAW PRE: 0.1 1/(CMH2O*S) (ref 0.08–0.08)
SGAW REF: 0.08
TLC LLN: 5.37
TLC PRE REF: 88.6 %
TLC REF: 6.52
TLC ULN: 7.67
ULN IC: ABNORMAL
VA PRE: 5.22 L (ref 6.37–6.37)
VA SINGLE BREATH PRE REF: 81.9 %
VA SINGLE BREATH REF: 6.37
VC LLN: 2.66
VC PRE REF: 91.1 %
VC PRE: 3.31 L (ref 2.66–4.63)
VC REF: 3.64
VC ULN: 4.63

## 2025-01-06 PROCEDURE — 94727 GAS DIL/WSHOT DETER LNG VOL: CPT

## 2025-01-06 PROCEDURE — 94729 DIFFUSING CAPACITY: CPT

## 2025-01-06 PROCEDURE — 94760 N-INVAS EAR/PLS OXIMETRY 1: CPT

## 2025-01-06 PROCEDURE — 94060 EVALUATION OF WHEEZING: CPT

## 2025-01-06 RX ORDER — ALBUTEROL SULFATE 0.83 MG/ML
2.5 SOLUTION RESPIRATORY (INHALATION)
Status: COMPLETED | OUTPATIENT
Start: 2025-01-06 | End: 2025-01-06

## 2025-01-06 RX ADMIN — ALBUTEROL SULFATE 2.5 MG: 0.83 SOLUTION RESPIRATORY (INHALATION) at 09:01

## 2025-01-07 RX ORDER — ALBUTEROL SULFATE 0.83 MG/ML
SOLUTION RESPIRATORY (INHALATION)
Status: DISPENSED
Start: 2025-01-07 | End: 2025-01-07

## 2025-01-15 ENCOUNTER — OFFICE VISIT (OUTPATIENT)
Dept: CARDIAC SURGERY | Facility: CLINIC | Age: 75
End: 2025-01-15
Payer: MEDICARE

## 2025-01-15 VITALS
SYSTOLIC BLOOD PRESSURE: 122 MMHG | BODY MASS INDEX: 29.26 KG/M2 | OXYGEN SATURATION: 94 % | HEART RATE: 56 BPM | WEIGHT: 209 LBS | HEIGHT: 71 IN | DIASTOLIC BLOOD PRESSURE: 74 MMHG

## 2025-01-15 DIAGNOSIS — Z79.1 ENCOUNTER FOR MONITORING NSAID THERAPY: ICD-10-CM

## 2025-01-15 DIAGNOSIS — R59.0 MEDIASTINAL LYMPHADENOPATHY: ICD-10-CM

## 2025-01-15 DIAGNOSIS — Z51.81 ENCOUNTER FOR MONITORING NSAID THERAPY: ICD-10-CM

## 2025-01-15 PROCEDURE — 99204 OFFICE O/P NEW MOD 45 MIN: CPT | Mod: ,,, | Performed by: THORACIC SURGERY (CARDIOTHORACIC VASCULAR SURGERY)

## 2025-01-15 NOTE — H&P (VIEW-ONLY)
History & Physical    SUBJECTIVE:     History of Present Illness:  The patient is presenting for evaluation for mediastinal lymphadenopathy.  He has a CT scan revealed evidence of a AP window lymph node.      Chief Complaint   Patient presents with    Pre-op Exam     REFERRAL-DR. MCKAY-LUNG RESECTION. DX: MEDIASTINAL ADENOPATHY-LYMPH NODE VS MASS 2.3 X 1.9 CM. PMH: A-FIB, HTN, HLD, GERD, OBESITY, PRE DM, PROSTATE CA, SLEEP APNEA.       Review of patient's allergies indicates:  No Known Allergies    Current Outpatient Medications   Medication Sig Dispense Refill    atorvastatin (LIPITOR) 20 MG tablet Take 20 mg by mouth once daily.      cetirizine (ZYRTEC) 10 MG tablet Take 1 tablet by mouth every evening.      chlorpheniramine (CHLOR-TRIMETON) 4 mg tablet Take 1 tablet by mouth every 6 (six) hours as needed.      dulaglutide (TRULICITY) 0.75 mg/0.5 mL pen injector Inject 0.75 mg into the skin every 7 days.      DULoxetine (CYMBALTA) 30 MG capsule Take 1 capsule by mouth every evening.      ELIQUIS 5 mg Tab Take 1 tablet by mouth 2 (two) times daily.      flecainide (TAMBOCOR) 100 MG Tab Take 50 mg by mouth 2 (two) times daily.      hydroCHLOROthiazide (HYDRODIURIL) 25 MG tablet Take 1 tablet by mouth once daily.      metoprolol succinate (TOPROL-XL) 50 MG 24 hr tablet Take 25-50 mg by mouth 2 (two) times daily. Take 50 mg in Am and 25 mg in evening.      NP THYROID 90 mg Tab Take 90 mg by mouth before breakfast.      omeprazole (PRILOSEC) 20 MG capsule Take 20 mg by mouth once daily.      tamsulosin (FLOMAX) 0.4 mg Cap Take 1 capsule by mouth once daily.      triamcinolone (NASACORT) 55 mcg nasal inhaler 2 sprays by Nasal route daily as needed (allergies).       No current facility-administered medications for this visit.       Past Medical History:   Diagnosis Date    A-fib     Anxiety     Depression     Dizziness     Fatigue     GERD (gastroesophageal reflux disease)     HLD (hyperlipidemia)     HTN  "(hypertension)     Lightheadedness     Obesity     Other seasonal allergic rhinitis     Prediabetes     Prostate cancer     Sleep apnea     uses machine    SOB (shortness of breath)     Thyroid disease      Past Surgical History:   Procedure Laterality Date    ABLATION N/A 8/19/2024    Procedure: Ablation;  Surgeon: Kee Islas MD;  Location: [facility] CATH LAB;  Service: Cardiology;  Laterality: N/A;  EPS + AF CATH ABLATION + TEDDY W/ ANEST.    CARDIOVERSION      x2    CATARACT EXTRACTION Bilateral     COLONOSCOPY      ECHOCARDIOGRAM,TRANSESOPHAGEAL N/A 8/19/2024    Procedure: Transesophageal echo (TEDDY) intra-procedure log documentation;  Surgeon: Provider, [facility];  Location: [facility] CATH LAB;  Service: Cardiology;  Laterality: N/A;    ELBOW SURGERY Left     multiple    ESOPHAGOGASTRODUODENOSCOPY      NECK SURGERY      ORCHIECTOMY      TONSILLECTOMY      VEIN SURGERY       Family History   Problem Relation Name Age of Onset    Prostate cancer Father       Social History     Tobacco Use    Smoking status: Never    Smokeless tobacco: Never   Substance Use Topics    Alcohol use: Not Currently     Alcohol/week: 2.0 standard drinks of alcohol     Types: 2 Cans of beer per week    Drug use: Never        Review of Systems:  Review of Systems   Constitutional: Negative.    HENT: Negative.     Eyes: Negative.    Respiratory: Negative.     Cardiovascular: Negative.    Gastrointestinal: Negative.    Endocrine: Negative.    Genitourinary: Negative.    Musculoskeletal: Negative.         Claudications   Skin: Negative.    Allergic/Immunologic: Negative.    Neurological: Negative.    Hematological: Negative.    Psychiatric/Behavioral: Negative.         OBJECTIVE:     Vital Signs (Most Recent)  Pulse: (!) 56 (01/15/25 1306)  BP: 122/74 (01/15/25 1306)  SpO2: (!) 94 % (01/15/25 1306)  5' 11" (1.803 m)  94.8 kg (209 lb)     Physical Exam:  Physical Exam  Vitals reviewed.   Constitutional:       Appearance: Normal appearance. "   HENT:      Head: Normocephalic and atraumatic.      Nose: Nose normal.      Mouth/Throat:      Mouth: Mucous membranes are dry.      Pharynx: Oropharynx is clear.   Eyes:      Extraocular Movements: Extraocular movements intact.      Conjunctiva/sclera: Conjunctivae normal.      Pupils: Pupils are equal, round, and reactive to light.   Cardiovascular:      Rate and Rhythm: Normal rate and regular rhythm.      Pulses: Normal pulses.   Pulmonary:      Effort: Pulmonary effort is normal.      Breath sounds: Normal breath sounds.   Abdominal:      General: Abdomen is flat.      Palpations: Abdomen is soft.   Musculoskeletal:         General: Normal range of motion.      Cervical back: Neck supple.   Skin:     General: Skin is warm and dry.   Neurological:      General: No focal deficit present.   Psychiatric:         Mood and Affect: Mood normal.         Laboratory:  None      Diagnostic Results:  CT scan of the chest revealed mediastinal AP lymph node.  There is definitely paratracheal lymphadenopathy and other lymphadenopathy in the chest.      ASSESSMENT/PLAN:     I believe the best plan of management is to proceed with mediastinoscopy as that right paratracheal lymph node is easier to access from a surgical procedure.  If that turns nondiagnostic I will plan to repeat the CT scan and possibly do a [place] procedure on the long-term.  He understands the risks and benefits of the procedure and elected to proceed

## 2025-01-16 ENCOUNTER — ANESTHESIA EVENT (OUTPATIENT)
Dept: SURGERY | Facility: HOSPITAL | Age: 75
End: 2025-01-16
Payer: MEDICARE

## 2025-01-23 ENCOUNTER — ANESTHESIA (OUTPATIENT)
Dept: SURGERY | Facility: HOSPITAL | Age: 75
End: 2025-01-23
Payer: MEDICARE

## 2025-01-29 ENCOUNTER — HOSPITAL ENCOUNTER (OUTPATIENT)
Dept: RADIOLOGY | Facility: HOSPITAL | Age: 75
Discharge: HOME | End: 2025-01-29
Attending: THORACIC SURGERY (CARDIOTHORACIC VASCULAR SURGERY)
Payer: MEDICARE

## 2025-01-29 DIAGNOSIS — R59.0 MEDIASTINAL LYMPHADENOPATHY: ICD-10-CM

## 2025-01-29 PROCEDURE — 71046 X-RAY EXAM CHEST 2 VIEWS: CPT | Mod: TC

## 2025-01-30 NOTE — PRE-PROCEDURE INSTRUCTIONS
"Your surgeon's office will contact you with your time of arrival.   [facility]: Outpatient Surgery  Preprocedure Check-In Instructions       We ask patients to arrive about 2 hours before surgery to allow for enough time to review your health history & medications, start your IV, complete any outstanding labwork or tests, and meet your Anesthesiologist.    Expectations: "Because of inconsistent procedure completion times, an unexpected wait may occur. The Physicians would like you to be here to prepare in the event they run ahead of time. We will make you as comfortable as possible and keep you informed. We apologize in advance if this happens."    You will arrive at [facility], [address], [place].  Enter through the [place] entrance next to the Emergency Room, and come to the 6th floor to the Outpatient Surgery Department.     Visitory Policy:  You are allowed 2 adult visitors to be with you in the hospital. All hospital visitors should be in good current health.  No small children.     What to Bring:  Please have your ID, insurance cards, and all home medication bottles with you at check in.  Bring your CPAP machine if one is used at home.     Fasting:  Nothing to eat after midnight the night before your procedure. This includes no gum and/or tobacco products. You may have clear liquids limited to only: WATER, GATORADE, POWERADE and children can also have PEDIALYTE AND/OR APPLE JUICE , up until 2 hours before your arrival time.   Follow your doctor's instructions for taking any medications on the morning of your procedure.  If no instructions for taking medications were given, do not take any medications but bring your medications in their bottles to your procedure check in.     Follow your doctor's preoperative instructions regarding skin prep, bowel prep, bathing, or showering prior to your procedure.  If any special soaps were provided to you, please use " according to your doctor's instructions. If no instructions were given from your doctor, take a good bath or shower with antibacterial soap the night before and the morning of your procedure.  On the morning of procedure, wear loose, comfortable clothing.  No lotions, makeup, perfumes, colognes, deodorant, or jewelry to your procedure.  Removable items (glasses, contact lenses, dentures, retainers, hearing aids) need to be removed for your procedure.  Bring your storage containers for these items if you wear them.     Artificial nails, body jewelry, eyelash extensions, and/or hair extensions with metal clips are not allowed during your surgery.  If you currently wear any of these items, please arrange for them to be removed prior to your arrival to the hospital.     Outpatient or Same Day Surgeries:  Any patients receiving sedation/anesthesia are advised not to drive for 24 hours after their procedure.  We do not allow patients to drive themselves home after discharge.  If you are going home after your procedure, please have someone available to drive you home from the hospital.        You may call the Outpatient Surgery Department at [phone] with any questions or concerns.  We are looking forward to meeting you and taking great care of you for your procedure.  Thank you for choosing [facility] for your surgical needs.

## 2025-01-30 NOTE — CLINICAL REVIEW
Eliquis last dose 1/20/25. Trulicity held per GLP1 agonist protocol. labs/EKG/CXR reviewed. cardiology notes utd. Echo/Angio/PFTs noted. chart review complete. ccv    None

## 2025-01-31 ENCOUNTER — HOSPITAL ENCOUNTER (OUTPATIENT)
Facility: HOSPITAL | Age: 75
Discharge: HOME | End: 2025-01-31
Attending: THORACIC SURGERY (CARDIOTHORACIC VASCULAR SURGERY) | Admitting: THORACIC SURGERY (CARDIOTHORACIC VASCULAR SURGERY)
Payer: MEDICARE

## 2025-01-31 VITALS
WEIGHT: 215.38 LBS | RESPIRATION RATE: 16 BRPM | TEMPERATURE: 98 F | HEIGHT: 67 IN | BODY MASS INDEX: 33.8 KG/M2 | HEART RATE: 63 BPM | DIASTOLIC BLOOD PRESSURE: 66 MMHG | OXYGEN SATURATION: 96 % | SYSTOLIC BLOOD PRESSURE: 132 MMHG

## 2025-01-31 DIAGNOSIS — R59.0 MEDIASTINAL LYMPHADENOPATHY: ICD-10-CM

## 2025-01-31 PROCEDURE — 88305 TISSUE EXAM BY PATHOLOGIST: CPT | Performed by: THORACIC SURGERY (CARDIOTHORACIC VASCULAR SURGERY)

## 2025-01-31 PROCEDURE — D9220A PRA ANESTHESIA: Mod: ANES,,, | Performed by: ANESTHESIOLOGY

## 2025-01-31 PROCEDURE — 71000039 HC RECOVERY, EACH ADD'L HOUR: Performed by: THORACIC SURGERY (CARDIOTHORACIC VASCULAR SURGERY)

## 2025-01-31 PROCEDURE — 71000016 HC POSTOP RECOV ADDL HR: Performed by: THORACIC SURGERY (CARDIOTHORACIC VASCULAR SURGERY)

## 2025-01-31 PROCEDURE — D9220A PRA ANESTHESIA: Mod: CRNA,,, | Performed by: NURSE ANESTHETIST, CERTIFIED REGISTERED

## 2025-01-31 PROCEDURE — 88341 IMHCHEM/IMCYTCHM EA ADD ANTB: CPT

## 2025-01-31 PROCEDURE — 71000033 HC RECOVERY, INTIAL HOUR: Performed by: THORACIC SURGERY (CARDIOTHORACIC VASCULAR SURGERY)

## 2025-01-31 PROCEDURE — 71000015 HC POSTOP RECOV 1ST HR: Performed by: THORACIC SURGERY (CARDIOTHORACIC VASCULAR SURGERY)

## 2025-01-31 PROCEDURE — 63600175 PHARM REV CODE 636 W HCPCS: Performed by: THORACIC SURGERY (CARDIOTHORACIC VASCULAR SURGERY)

## 2025-01-31 PROCEDURE — 63600175 PHARM REV CODE 636 W HCPCS: Performed by: NURSE ANESTHETIST, CERTIFIED REGISTERED

## 2025-01-31 PROCEDURE — 25000003 PHARM REV CODE 250: Performed by: ANESTHESIOLOGY

## 2025-01-31 PROCEDURE — 27201423 OPTIME MED/SURG SUP & DEVICES STERILE SUPPLY: Performed by: THORACIC SURGERY (CARDIOTHORACIC VASCULAR SURGERY)

## 2025-01-31 PROCEDURE — 37000008 HC ANESTHESIA 1ST 15 MINUTES: Performed by: THORACIC SURGERY (CARDIOTHORACIC VASCULAR SURGERY)

## 2025-01-31 PROCEDURE — 36000711: Performed by: THORACIC SURGERY (CARDIOTHORACIC VASCULAR SURGERY)

## 2025-01-31 PROCEDURE — 37000009 HC ANESTHESIA EA ADD 15 MINS: Performed by: THORACIC SURGERY (CARDIOTHORACIC VASCULAR SURGERY)

## 2025-01-31 PROCEDURE — 88342 IMHCHEM/IMCYTCHM 1ST ANTB: CPT

## 2025-01-31 PROCEDURE — 25000003 PHARM REV CODE 250: Performed by: NURSE ANESTHETIST, CERTIFIED REGISTERED

## 2025-01-31 PROCEDURE — 36000710: Performed by: THORACIC SURGERY (CARDIOTHORACIC VASCULAR SURGERY)

## 2025-01-31 RX ORDER — ROCURONIUM BROMIDE 10 MG/ML
INJECTION, SOLUTION INTRAVENOUS
Status: DISCONTINUED | OUTPATIENT
Start: 2025-01-31 | End: 2025-01-31

## 2025-01-31 RX ORDER — PROPOFOL 10 MG/ML
VIAL (ML) INTRAVENOUS
Status: DISCONTINUED | OUTPATIENT
Start: 2025-01-31 | End: 2025-01-31

## 2025-01-31 RX ORDER — PROCHLORPERAZINE EDISYLATE 5 MG/ML
5 INJECTION INTRAMUSCULAR; INTRAVENOUS EVERY 30 MIN PRN
Status: DISCONTINUED | OUTPATIENT
Start: 2025-01-31 | End: 2025-01-31 | Stop reason: HOSPADM

## 2025-01-31 RX ORDER — DEXAMETHASONE SODIUM PHOSPHATE 4 MG/ML
INJECTION, SOLUTION INTRA-ARTICULAR; INTRALESIONAL; INTRAMUSCULAR; INTRAVENOUS; SOFT TISSUE
Status: DISCONTINUED | OUTPATIENT
Start: 2025-01-31 | End: 2025-01-31

## 2025-01-31 RX ORDER — ONDANSETRON HYDROCHLORIDE 2 MG/ML
INJECTION, SOLUTION INTRAVENOUS
Status: DISCONTINUED | OUTPATIENT
Start: 2025-01-31 | End: 2025-01-31

## 2025-01-31 RX ORDER — CEFUROXIME SODIUM 1.5 G/16ML
1.5 INJECTION, POWDER, FOR SOLUTION INTRAVENOUS
Status: COMPLETED | OUTPATIENT
Start: 2025-01-31 | End: 2025-01-31

## 2025-01-31 RX ORDER — DEXMEDETOMIDINE HYDROCHLORIDE 100 UG/ML
INJECTION, SOLUTION INTRAVENOUS
Status: DISCONTINUED | OUTPATIENT
Start: 2025-01-31 | End: 2025-01-31

## 2025-01-31 RX ORDER — FENTANYL CITRATE 50 UG/ML
INJECTION, SOLUTION INTRAMUSCULAR; INTRAVENOUS
Status: DISCONTINUED | OUTPATIENT
Start: 2025-01-31 | End: 2025-01-31

## 2025-01-31 RX ORDER — IPRATROPIUM BROMIDE AND ALBUTEROL SULFATE 2.5; .5 MG/3ML; MG/3ML
3 SOLUTION RESPIRATORY (INHALATION)
Status: DISCONTINUED | OUTPATIENT
Start: 2025-01-31 | End: 2025-01-31 | Stop reason: HOSPADM

## 2025-01-31 RX ORDER — HYDROMORPHONE HYDROCHLORIDE 2 MG/ML
0.2 INJECTION, SOLUTION INTRAMUSCULAR; INTRAVENOUS; SUBCUTANEOUS EVERY 5 MIN PRN
Status: DISCONTINUED | OUTPATIENT
Start: 2025-01-31 | End: 2025-01-31 | Stop reason: HOSPADM

## 2025-01-31 RX ORDER — ONDANSETRON HYDROCHLORIDE 2 MG/ML
4 INJECTION, SOLUTION INTRAVENOUS DAILY PRN
Status: DISCONTINUED | OUTPATIENT
Start: 2025-01-31 | End: 2025-01-31 | Stop reason: HOSPADM

## 2025-01-31 RX ORDER — EPHEDRINE SULFATE 50 MG/ML
INJECTION, SOLUTION INTRAVENOUS
Status: DISCONTINUED | OUTPATIENT
Start: 2025-01-31 | End: 2025-01-31

## 2025-01-31 RX ORDER — SODIUM CITRATE AND CITRIC ACID MONOHYDRATE 334; 500 MG/5ML; MG/5ML
30 SOLUTION ORAL ONCE
Status: COMPLETED | OUTPATIENT
Start: 2025-01-31 | End: 2025-01-31

## 2025-01-31 RX ORDER — LIDOCAINE HYDROCHLORIDE 20 MG/ML
INJECTION, SOLUTION EPIDURAL; INFILTRATION; INTRACAUDAL; PERINEURAL
Status: DISCONTINUED | OUTPATIENT
Start: 2025-01-31 | End: 2025-01-31

## 2025-01-31 RX ORDER — LIDOCAINE HYDROCHLORIDE 10 MG/ML
1 INJECTION, SOLUTION EPIDURAL; INFILTRATION; INTRACAUDAL; PERINEURAL ONCE
Status: DISCONTINUED | OUTPATIENT
Start: 2025-01-31 | End: 2025-01-31 | Stop reason: HOSPADM

## 2025-01-31 RX ORDER — MIDAZOLAM HYDROCHLORIDE 2 MG/2ML
2 INJECTION, SOLUTION INTRAMUSCULAR; INTRAVENOUS
Status: ACTIVE | OUTPATIENT
Start: 2025-01-31 | End: 2025-01-31

## 2025-01-31 RX ORDER — GLUCAGON 1 MG
1 KIT INJECTION
Status: DISCONTINUED | OUTPATIENT
Start: 2025-01-31 | End: 2025-01-31 | Stop reason: HOSPADM

## 2025-01-31 RX ORDER — ACETAMINOPHEN 500 MG
1000 TABLET ORAL ONCE
Status: COMPLETED | OUTPATIENT
Start: 2025-01-31 | End: 2025-01-31

## 2025-01-31 RX ORDER — SODIUM CHLORIDE, SODIUM GLUCONATE, SODIUM ACETATE, POTASSIUM CHLORIDE AND MAGNESIUM CHLORIDE 30; 37; 368; 526; 502 MG/100ML; MG/100ML; MG/100ML; MG/100ML; MG/100ML
INJECTION, SOLUTION INTRAVENOUS CONTINUOUS
Status: DISCONTINUED | OUTPATIENT
Start: 2025-01-31 | End: 2025-01-31 | Stop reason: HOSPADM

## 2025-01-31 RX ORDER — HYDROCODONE BITARTRATE AND ACETAMINOPHEN 5; 325 MG/1; MG/1
1 TABLET ORAL EVERY 6 HOURS PRN
Qty: 28 TABLET | Refills: 0 | Status: SHIPPED | OUTPATIENT
Start: 2025-01-31 | End: 2025-02-07

## 2025-01-31 RX ADMIN — CEFUROXIME 1.5 G: 1.5 INJECTION, POWDER, FOR SOLUTION INTRAVENOUS at 07:01

## 2025-01-31 RX ADMIN — SUGAMMADEX 200 MG: 100 INJECTION, SOLUTION INTRAVENOUS at 07:01

## 2025-01-31 RX ADMIN — LIDOCAINE HYDROCHLORIDE 80 MG: 20 INJECTION, SOLUTION INTRAVENOUS at 06:01

## 2025-01-31 RX ADMIN — PROPOFOL 140 MG: 10 INJECTION, EMULSION INTRAVENOUS at 06:01

## 2025-01-31 RX ADMIN — SODIUM CHLORIDE, SODIUM GLUCONATE, SODIUM ACETATE, POTASSIUM CHLORIDE AND MAGNESIUM CHLORIDE: 526; 502; 368; 37; 30 INJECTION, SOLUTION INTRAVENOUS at 06:01

## 2025-01-31 RX ADMIN — DEXAMETHASONE SODIUM PHOSPHATE 8 MG: 4 INJECTION, SOLUTION INTRA-ARTICULAR; INTRALESIONAL; INTRAMUSCULAR; INTRAVENOUS; SOFT TISSUE at 07:01

## 2025-01-31 RX ADMIN — FENTANYL CITRATE 100 MCG: 50 INJECTION, SOLUTION INTRAMUSCULAR; INTRAVENOUS at 06:01

## 2025-01-31 RX ADMIN — DEXMEDETOMIDINE 4 MCG: 200 INJECTION, SOLUTION INTRAVENOUS at 07:01

## 2025-01-31 RX ADMIN — ROCURONIUM BROMIDE 50 MG: 10 SOLUTION INTRAVENOUS at 06:01

## 2025-01-31 RX ADMIN — SODIUM CITRATE AND CITRIC ACID MONOHYDRATE 30 ML: 500; 334 SOLUTION ORAL at 05:01

## 2025-01-31 RX ADMIN — ONDANSETRON 4 MG: 2 INJECTION INTRAMUSCULAR; INTRAVENOUS at 07:01

## 2025-01-31 RX ADMIN — EPHEDRINE SULFATE 10 MG: 50 INJECTION INTRAVENOUS at 07:01

## 2025-01-31 RX ADMIN — ACETAMINOPHEN 1000 MG: 500 TABLET ORAL at 05:01

## 2025-01-31 RX ADMIN — EPHEDRINE SULFATE 15 MG: 50 INJECTION INTRAVENOUS at 07:01

## 2025-01-31 NOTE — ANESTHESIA POSTPROCEDURE EVALUATION
Anesthesia Post Evaluation    Patient: Hilario Esteves    Procedure(s) Performed: Procedure(s) (LRB):  MEDIASTINOSCOPY (N/A)    Final Anesthesia Type: general      Patient location during evaluation: PACU  Patient participation: Yes- Able to Participate  Level of consciousness: awake and alert and oriented  Post-procedure vital signs: reviewed and stable  Pain management: adequate  Airway patency: patent    PONV status at discharge: No PONV  Anesthetic complications: no      Cardiovascular status: hemodynamically stable  Respiratory status: unassisted  Hydration status: euvolemic  Follow-up not needed.              Vitals Value Taken Time   /83 01/31/25 0931   Temp 36.4 °C (97.5 °F) 01/31/25 0812   Pulse 63 01/31/25 1002   Resp 11 01/31/25 1002   SpO2 95 % 01/31/25 1002   Vitals shown include unfiled device data.      No case tracking events are documented in the log.      Pain/Rogers Score: Pain Rating Prior to Med Admin: 0 (1/31/2025  5:44 AM)  Rogers Score: 10 (1/31/2025  8:50 AM)

## 2025-01-31 NOTE — OP NOTE
[facility]                       [address] [id number]    PATIENT NAME:      ANABEL SHAW  YOB: 1950  CSN:               [id number]  MRN:               [id number]  ADMIT DATE:        01/31/2025 04:58:00  PHYSICIAN:         Carley Patino MD                          OPERATIVE REPORT      DATE OF SURGERY:    01/31/2025 00:00:00    SURGEON:  Carley Patino MD    PREOPERATIVE DIAGNOSIS:  Mediastinal lymphadenopathy.    PROCEDURE:  Mediastinoscopy.    POSTOPERATIVE DIAGNOSIS:  Enlarged 4R lymph node.    ANESTHESIA:  General.    BLOOD LOSS:  Minimal.    TECHNIQUE:  Under informed consent, the patient was taken to the OR, put in   supine position.  General anesthesia was induced and therefore maintained for   the remainder of the procedure.  All pressure points padded equally.  Skin over   the chest and neck was prepped and draped in usual sterile fashion.  IV   antibiotics were administered.  The patient has partial frozen neck and as such,   could not extend his neck very well for a good angle on the mediastinoscopy.  A   small incision was made over the suprasternal notch, taking down subcutaneous   tissue and platysma.  The pretracheal plane was penetrated.  I was able to   advance the scope all the way to the 4R location.  This is a large lymph node   with intracardiac segments and more pathologic solid segment.  Multiple biopsies   were taken and sent for pathology.  The wounds were irrigated and closed in   layers of absorbable sutures for fascia, subcutaneous tissue, and skin.  The   patient tolerated the procedure well.        ______________________________  MD VICK [place]/CHAN  DD:  01/31/2025  Time:  07:54AM  DT:  01/31/2025  Time:  09:08AM  Job #:  [id number]      OPERATIVE REPORT

## 2025-01-31 NOTE — DISCHARGE INSTRUCTIONS
Do not drive or drink alcohol for 24 hours or while taking pain medications.    A chest Xray will be taken prior to discharge.     Report these Signs and Symptoms to your Surgeon if they occur.     Signs of infection. These include a fever of 100.4°F (38°C) or higher, chills.  Signs of wound infection. These include swelling, redness, warmth around the wound; too much pain when touched; yellowish, greenish, or bloody discharge; foul smell coming from the cut site; cut site opens up.     Report to ER with any excessive uncontrollable bleeding, heavy increase bruising at site (growing in size), or SEVERE/SUDDEN chest pain and/or shortness of breath, uncontrollable vomitting.      Resume regular diet. Okay to shower tomorrow. NO tub baths or submerging in water for at least 7 days (about the time incision is fully healed).  Do not scrub the incision, pat dry after bathing. Dermabond glue will start to fall off naturally over the next 7 days. Do not peel it off as this may reopen the incision and delay heeling.

## 2025-01-31 NOTE — ANESTHESIA PREPROCEDURE EVALUATION
01/30/2025  Hilario Esteves is a 74 y.o., male.      Hilario Esteves    Pre-op Diagnosis: Mediastinal lymphadenopathy [R59.0]    Procedure(s):  MEDIASTINOSCOPY     Review of patient's allergies indicates:  No Known Allergies    Current Outpatient Medications   Medication Instructions    atorvastatin (LIPITOR) 40 mg, Oral, Daily    cetirizine (ZYRTEC) 10 MG tablet 1 tablet, Nightly    chlorpheniramine (CHLOR-TRIMETON) 4 mg tablet 1 tablet, Every 6 hours PRN    DULoxetine (CYMBALTA) 30 MG capsule 1 capsule, Nightly    ELIQUIS 5 mg Tab 1 tablet, 2 times daily    flecainide (TAMBOCOR) 50 mg, 2 times daily    hydroCHLOROthiazide (HYDRODIURIL) 25 MG tablet 1 tablet, Daily    metoprolol succinate (TOPROL-XL) 25-50 mg, 2 times daily    NP THYROID 90 mg, Before breakfast    omeprazole (PRILOSEC) 20 mg, Daily    tamsulosin (FLOMAX) 0.4 mg Cap 1 capsule, Daily    triamcinolone (NASACORT) 55 mcg nasal inhaler 2 sprays, Daily PRN    TRULICITY 0.75 mg, Every 7 days   Last Trulicity 1/11/2025; last Eliquis 1/27/2025    WA MEDIASTINOSCPY W/MEDSTNL BX [[id number]] (MEDIASTINOSCOPY)    Past Medical History:   Diagnosis Date    A-fib     Anxiety     Depression     Dizziness     Fatigue     GERD (gastroesophageal reflux disease)     HLD (hyperlipidemia)     HTN (hypertension)     Lightheadedness     Mediastinal lymphadenopathy     Obesity     Other seasonal allergic rhinitis     Prediabetes     Prostate cancer     Sleep apnea     uses machine    SOB (shortness of breath)     Thyroid disease    Successful Ablation of Atrial Fibrillation    Past Surgical History:   Procedure Laterality Date    ABLATION N/A 8/19/2024    Procedure: Ablation;  Surgeon: Kee Islas MD;  Location: [facility] CATH LAB;  Service: Cardiology;  Laterality: N/A;  EPS + AF CATH ABLATION + TEDDY W/ ANEST.    CARDIOVERSION      x2    CATARACT EXTRACTION Bilateral      REVIEW OF SYSTEMS:  GENERAL: Patient denies fevers,chills,night sweats, +excessive fatigue/tiredness, anorexia, weight loss  ALLERGIC/IMMUNOLOGIC: no reactions  EYES: Patient denies diplopia, significant visual difficulties +watery eyes   ENT/MOUTH: Patient denies mucositis, problems with hearing, sore throat, or mouth sores, sinus drainage  ENDOCRINE: Patient denies diabetes, thyroid disease, hormone replacement, hot flashes or night sweats  HEMATOLOGIC/LYMPHATIC: Patient denies easy bruising or bleeding, tender or palpable lymph nodes  BREASTS: Patient denies abnormal masses of breast, nipple discharge, pain  RESPIRATORY:Patient denies dyspnea on exertion, chest pain, cough, hemoptysis  CARDIOVASCULAR: Patient denies anginal chest pain, palpitations, orthopnea,   peripheral edema  GASTROINTESTINAL: Patient denies nausea, vomiting, diarrhea, Gi bleeding,   constipation, change in bowel habits, heartburn, early satiety   : Patient denies abnormal genital masses, hematuria, hesitancy, dysuria,increase frequency, urgency, incontinence, problems with sexual activity  MUSCULOSKELETAL: Patient denies joint pain, swelling or redness, decreased range of motion +bilat leg and low back pain  SKIN: Patient denies chronic rashes, inflammation, ulceration or skin changes  NEUROLOGIC: Patient denies headache, blurred vision, areas of focal weakness or numbness, abnormal gait, sensory problems  PSYCHIATRIC: Patient denies insomnia, depression, anxiety, mood swings, kina, psychotropic drugs    Requesting vicodin refill COLONOSCOPY      ECHOCARDIOGRAM,TRANSESOPHAGEAL N/A 8/19/2024    Procedure: Transesophageal echo (TEDDY) intra-procedure log documentation;  Surgeon: Provider, [facility];  Location: [facility] CATH LAB;  Service: Cardiology;  Laterality: N/A;    ELBOW SURGERY Left     multiple    ESOPHAGOGASTRODUODENOSCOPY      NECK SURGERY      ORCHIECTOMY      TONSILLECTOMY      VEIN SURGERY       Lab Results   Component Value Date    WBC 6.14 01/29/2025    HGB 14.3 01/29/2025    HCT 41.7 (L) 01/29/2025    MCV 99.3 (H) 01/29/2025     01/29/2025          BMP  Lab Results   Component Value Date     01/29/2025    K 3.8 01/29/2025     01/29/2025    CO2 28 01/29/2025    BUN 14.5 01/29/2025    CREATININE 1.14 01/29/2025    CALCIUM 9.2 01/29/2025     CT CHEST 12/18/24    FINDINGS:  The lungs are adequately aerated.  No infiltrate is seen.  No mass is seen.  No lesion is seen.  No pleural effusion is seen.  No pleural thickening is seen.  There is an anterior mediastinal lymph node seen on image 45 series 3.  It measures 2.3 x 1.9 cm.  It is stable..  There is a benign 9.5 mm lymph node in the right paratracheal region.  This is also stable.  No enhancing lesion is seen.  Thoracic aorta appears grossly unremarkable.  Heart appears normal.     Visualized portions of the upper abdomen show no acute abnormality.     Impression:  Stable mediastinal lymphadenopathy.  One additional six-month follow-up is recommended.     Electronically signed by:Saul Aguilar     12/18/2024   10:46       CXR 1/129/25    FINDINGS:  No alveolar consolidation, effusion, or pneumothorax is seen.   The thoracic aorta is normal  cardiac silhouette, central pulmonary vessels and mediastinum are normal in size and are grossly unremarkable.   visualized osseous structures are grossly unremarkable.     Impression:   No acute chest disease is identified.      Electronically signed by:Alex Paez      01/29/2025  10:41    CARDIAC CATH  1/30/2024  HEMODYNAMICS   1. Left pullback post left ventriculography.       a.     Left ventricular pressure 123/14 at the mmHg       b.     Aortic pressure 123/68 mmHg The gradient across the aortic        valve is less than 5 mmHg.     2. Selective coronary arteriograms:.       a.     Left main:  The left main is angiographically normal.       b.     The left anterior descending is angiographically normal.       c.     The circumflex is angiographically normal.       d.     The right coronary artery dominant vessel of the heart and   angiographically normal.     3.Left Ventriculography a single LAY ventriculogram was performed   revealing an ejection fraction of 55%     TTE 8/19/24    Left Ventricle: The left ventricle is normal in size. Normal wall thickness. Mild global hypokinesis present. There is low normal systolic function. There is diastolic dysfunction.    Right Ventricle: Normal right ventricular cavity size. Systolic function is normal.    Left Atrium: Left atrium is moderately dilated. The left atrial appendage appears normal. There is no thrombus in the left atrial appendage.    Right Atrium: Right atrium is moderately dilated.    Aortic Valve: The aortic valve is a trileaflet valve.    Mitral Valve: There is mild to moderate regurgitation.       ECG 1/29/25  Vent. Rate :  64 BPM     Atrial Rate :  64 BPM      P-R Int : 170 ms          QRS Dur : 108 ms       QT Int : 456 ms       P-R-T Axes :  73   7  97 degrees     QTcB Int : 470 ms     Sinus rhythm with Premature atrial complexes   Nonspecific ST and T wave abnormality   Prolonged QT   Abnormal ECG   No previous ECGs available   Confirmed by Tay Payton ([id number]) on 1/30/2025 8:28:59 AM     11/8/2024 CARDIOLOGY APPOINTMENT  H/O ABLATION FOR PERSISTENT A FIB, PRIOR CARDIOVERSION, VENOUS INSUFFICIENCY     Pre-op Assessment    I have reviewed the Patient Summary Reports.    I have reviewed the NPO Status.   I have reviewed the Medications.     Review of  Systems  Anesthesia Hx:  No problems with previous Anesthesia             Denies Family Hx of Anesthesia complications.    Denies Personal Hx of Anesthesia complications.                    Social:  Non-Smoker       Hematology/Oncology:                                  Oncology Comments: PROSTATE CANCER     EENT/Dental:     Denies ear symptoms             Cardiovascular:  Exercise tolerance: good   Hypertension    Dysrhythmias atrial fibrillation  Denies Angina.   Denies Orthopnea.  Denies PND.    Denies BANUELOS.   Cardiac Ablation of A Fib   Functional Capacity good / => 4 METS                         Pulmonary:      Shortness of breath  Sleep Apnea, CPAP                Hepatic/GI:     GERD                Musculoskeletal:  Musculoskeletal Normal                Neurological:  Denies TIA.  Denies CVA.         Neuro Symptoms of dizziness                            Endocrine:  Diabetes (pre DM)         Obesity / BMI > 30  Psych:   anxiety depression                Physical Exam  General: Well nourished, Alert and Oriented    Airway:  Mallampati: III   Mouth Opening: Normal  TM Distance: Normal  Tongue: Normal  Neck ROM: Normal ROM    Dental:  Intact    Chest/Lungs:  Clear to auscultation    Heart:  Rate: Normal  Rhythm: Regular Rhythm  No pretibial edema  No carotid bruits      Anesthesia Plan  Type of Anesthesia, risks & benefits discussed:    Anesthesia Type: Gen ETT  Intra-op Monitoring Plan: Standard ASA Monitors  Post Op Pain Control Plan: multimodal analgesia  Induction:  IV  Airway Plan: Direct, Post-Induction  Informed Consent: Informed consent signed with the Patient and all parties understand the risks and agree with anesthesia plan.  All questions answered. Patient consented to blood products? Yes  ASA Score: 3  Day of Surgery Review of History & Physical: H&P Update referred to the surgeon/provider.  Anesthesia Plan Notes: Right arm pulse oximeter monitoring for surgery    Ready For Surgery From Anesthesia  Perspective.     .

## 2025-01-31 NOTE — DISCHARGE SUMMARY
[facility] - Periop Services  Discharge Note  Short Stay    Procedure(s) (LRB):  MEDIASTINOSCOPY (N/A)      OUTCOME: Patient tolerated treatment/procedure well without complication and is now ready for discharge.    DISPOSITION: Home or Self Care    FINAL DIAGNOSIS:  Mediastinal lymphadenopathy    FOLLOWUP: In clinic    DISCHARGE INSTRUCTIONS:  No discharge procedures on file.      Clinical Reference Documents Added to Patient Instructions         Document    GENERAL ANESTHESIA DISCHARGE INSTRUCTIONS (ENGLISH)    MEDIASTINOSCOPY (ENGLISH)          Mr. Esteves is a 73yo male with mediastinal lymphadenopathy. He underwent a mediastinoscopy. Procedure was without complications. He recovered well in PACA and was stable for discharge once tolerating po diet, pain controlled, and able to void.  F/u with Dr. Patino in 3 weeks.  Call with any questions or concerns  TIME SPENT ON DISCHARGE: 20 minutes

## 2025-01-31 NOTE — ANESTHESIA PROCEDURE NOTES
Intubation    Date/Time: 1/31/2025 6:56 AM    Performed by: Ramone Solis CRNA  Authorized by: Bigg Child MD    Intubation:     Induction:  Intravenous    Intubated:  Postinduction    Mask Ventilation:  Easy mask    Attempts:  1    Attempted By:  CRNA    Method of Intubation:  Direct    Blade:  Pickard 2    Laryngeal View Grade: Grade I - full view of cords      Difficult Airway Encountered?: No      Complications:  None    Airway Device:  Oral endotracheal tube    Airway Device Size:  7.5    Style/Cuff Inflation:  Cuffed    Tube secured:  22    Secured at:  The lips    Placement Verified By:  Capnometry and Revisualization with laryngoscopy    Complicating Factors:  None    Findings Post-Intubation:  BS equal bilateral and atraumatic/condition of teeth unchanged

## 2025-01-31 NOTE — TRANSFER OF CARE
"Anesthesia Transfer of Care Note    Patient: Hilario Esteves    Procedure(s) Performed: Procedure(s) (LRB):  MEDIASTINOSCOPY (N/A)    Patient location: PACU    Anesthesia Type: general    Transport from OR: Transported from OR on room air with adequate spontaneous ventilation    Post pain: adequate analgesia    Post assessment: no apparent anesthetic complications    Post vital signs: stable    Level of consciousness: awake, alert and oriented    Nausea/Vomiting: no nausea/vomiting    Complications: none    Transfer of care protocol was followed      Last vitals: Visit Vitals  /75 (BP Location: Left arm, Patient Position: Lying)   Pulse 60   Temp 36.4 °C (97.5 °F) (Tympanic)   Resp 18   Ht 5' 7" (1.702 m)   Wt 97.7 kg (215 lb 6.2 oz)   SpO2 100%   BMI 33.73 kg/m²     "

## 2025-02-07 LAB — PSYCHE PATHOLOGY RESULT: NORMAL

## 2025-02-26 ENCOUNTER — OFFICE VISIT (OUTPATIENT)
Dept: CARDIAC SURGERY | Facility: CLINIC | Age: 75
End: 2025-02-26
Payer: MEDICARE

## 2025-02-26 VITALS
SYSTOLIC BLOOD PRESSURE: 130 MMHG | WEIGHT: 216 LBS | OXYGEN SATURATION: 96 % | HEIGHT: 67 IN | HEART RATE: 56 BPM | BODY MASS INDEX: 33.9 KG/M2 | DIASTOLIC BLOOD PRESSURE: 73 MMHG

## 2025-02-26 DIAGNOSIS — R59.0 MEDIASTINAL LYMPHADENOPATHY: Primary | ICD-10-CM

## 2025-02-26 PROCEDURE — 99024 POSTOP FOLLOW-UP VISIT: CPT | Mod: POP,,, | Performed by: THORACIC SURGERY (CARDIOTHORACIC VASCULAR SURGERY)

## 2025-02-26 NOTE — PROGRESS NOTES
"Hilario Esteves is a 74 y.o. male patient.   No diagnosis found.  Past Medical History:   Diagnosis Date    A-fib     Anxiety     Depression     Dizziness     Fatigue     GERD (gastroesophageal reflux disease)     HLD (hyperlipidemia)     HTN (hypertension)     Lightheadedness     Mediastinal lymphadenopathy     Obesity     Other seasonal allergic rhinitis     Prediabetes     Prostate cancer     Sleep apnea     uses machine    SOB (shortness of breath)     Thyroid disease      No past surgical history pertinent negatives on file.  Scheduled Meds:  Continuous Infusions:  PRN Meds:    Review of patient's allergies indicates:  No Known Allergies  There are no hospital problems to display for this patient.    Blood pressure 130/73, pulse (!) 56, height 5' 7" (1.702 m), weight 98 kg (216 lb), SpO2 96%.    Subjective:  The patient is doing very well status post mediastinoscopy.      Objective:  Wounds have healed well.  Final pathology is reactive lymph node.      Assessment & Plan:  I had a long discussion with the patient and his family.  At this time I plan to repeat a CT scan in March.  If the aortopulmonary lymph node is not smaller we will plan to resected.    Carley Patino MD  2/26/2025    "

## 2025-03-24 ENCOUNTER — HOSPITAL ENCOUNTER (OUTPATIENT)
Dept: RADIOLOGY | Facility: HOSPITAL | Age: 75
Discharge: HOME | End: 2025-03-24
Attending: THORACIC SURGERY (CARDIOTHORACIC VASCULAR SURGERY)
Payer: MEDICARE

## 2025-03-24 DIAGNOSIS — R59.0 MEDIASTINAL LYMPHADENOPATHY: ICD-10-CM

## 2025-03-24 PROCEDURE — 71260 CT THORAX DX C+: CPT | Mod: TC

## 2025-03-24 PROCEDURE — 25500020 PHARM REV CODE 255: Performed by: THORACIC SURGERY (CARDIOTHORACIC VASCULAR SURGERY)

## 2025-03-24 RX ADMIN — IOHEXOL 75 ML: 350 INJECTION, SOLUTION INTRAVENOUS at 08:03

## 2025-04-01 DIAGNOSIS — R59.0 MEDIASTINAL LYMPHADENOPATHY: Primary | ICD-10-CM

## (undated) DEVICE — Device

## (undated) DEVICE — DRESSING TRANS 4X4 TEGADERM

## (undated) DEVICE — INTRODUCER SHEATH 7F 11CM 35MM

## (undated) DEVICE — SUT MONOCRYL PLUS UD 3-0 27

## (undated) DEVICE — SOL NACL IRR 1000ML BTL

## (undated) DEVICE — CLIP LIGATING HEMOCLP SMALL

## (undated) DEVICE — CATH DECAPOLAR 6FRX110CM

## (undated) DEVICE — ELECTRODE REM PLYHSV RETURN 9

## (undated) DEVICE — GLOVE SURG BIOGEL LATEX SZ 7.5

## (undated) DEVICE — KIT VERSACROSS TRANSSEPTAL

## (undated) DEVICE — SET TUBING COOL POINT IRR

## (undated) DEVICE — R CATH VIEWFLX XTRA ICE 9F90CM

## (undated) DEVICE — CONTAINER SPECIMEN SCREW 4OZ

## (undated) DEVICE — DRESSING TELFA N ADH 3X8

## (undated) DEVICE — CATH TACTICATH ABLAT BIDIR D-F

## (undated) DEVICE — GLOVE PROTEXIS BLUE LATEX 7

## (undated) DEVICE — SHEATH VERSACROSS 0.035IN

## (undated) DEVICE — CONTINOUS SQUEEZE FLUSH DEVICE

## (undated) DEVICE — R CATH SUPRM QPLR CRD-2 6F 120

## (undated) DEVICE — PENCIL ELECSURG ROCKER 15FT

## (undated) DEVICE — CORD LAP 10 DISP

## (undated) DEVICE — KIT SURGICAL TURNOVER

## (undated) DEVICE — GLOVE PROTEXIS NEOPRN SZ8

## (undated) DEVICE — SHEATH 10FR 23CM BRITE TIP

## (undated) DEVICE — DRAPE STERI INCISE 23X23IN

## (undated) DEVICE — FLUID DELIVERY SET WITH FILTER

## (undated) DEVICE — KIT INTRO RADPQ BT 8FRX11CM

## (undated) DEVICE — PAD DEFIB RADIOTRANSPARENT

## (undated) DEVICE — CATH ADVISOR VL CIRC MAP BI-D

## (undated) DEVICE — CLIP LIGATING MEDIUM

## (undated) DEVICE — SHEATH BRITE TIP INTRO 11CM 9F

## (undated) DEVICE — SHEATH 10FR 11CM BRITE TIP

## (undated) DEVICE — KIT ENSITE ELECTRODE SURFACE

## (undated) DEVICE — SYR 10CC LUER LOCK

## (undated) DEVICE — ADAPTER DBL MALE LL CLR POLY

## (undated) DEVICE — ELECTRODE PATIENT RETURN DISP

## (undated) DEVICE — GLOVE PROTEXIS HYDROGEL SZ6.5

## (undated) DEVICE — HEMOSTAT SURGICEL FIBRLR 2X4IN

## (undated) DEVICE — SUT VICRYL 2-0 36 CT-1